# Patient Record
Sex: FEMALE | URBAN - METROPOLITAN AREA
[De-identification: names, ages, dates, MRNs, and addresses within clinical notes are randomized per-mention and may not be internally consistent; named-entity substitution may affect disease eponyms.]

---

## 2018-09-17 ENCOUNTER — APPOINTMENT (RX ONLY)
Dept: URBAN - METROPOLITAN AREA CLINIC 50 | Facility: CLINIC | Age: 68
Setting detail: DERMATOLOGY
End: 2018-09-17

## 2018-09-17 DIAGNOSIS — L81.4 OTHER MELANIN HYPERPIGMENTATION: ICD-10-CM

## 2018-09-17 DIAGNOSIS — D22 MELANOCYTIC NEVI: ICD-10-CM

## 2018-09-17 DIAGNOSIS — L28.0 LICHEN SIMPLEX CHRONICUS: ICD-10-CM

## 2018-09-17 PROBLEM — D22.9 MELANOCYTIC NEVI, UNSPECIFIED: Status: ACTIVE | Noted: 2018-09-17

## 2018-09-17 PROBLEM — D48.5 NEOPLASM OF UNCERTAIN BEHAVIOR OF SKIN: Status: ACTIVE | Noted: 2018-09-17

## 2018-09-17 PROBLEM — L85.3 XEROSIS CUTIS: Status: ACTIVE | Noted: 2018-09-17

## 2018-09-17 PROBLEM — Z85.828 PERSONAL HISTORY OF OTHER MALIGNANT NEOPLASM OF SKIN: Status: ACTIVE | Noted: 2018-09-17

## 2018-09-17 PROBLEM — I10 ESSENTIAL (PRIMARY) HYPERTENSION: Status: ACTIVE | Noted: 2018-09-17

## 2018-09-17 PROCEDURE — 99203 OFFICE O/P NEW LOW 30 MIN: CPT | Mod: 25

## 2018-09-17 PROCEDURE — ? BIOPSY BY SHAVE METHOD

## 2018-09-17 PROCEDURE — ? PRESCRIPTION

## 2018-09-17 PROCEDURE — 11100: CPT

## 2018-09-17 PROCEDURE — ? COUNSELING

## 2018-09-17 RX ORDER — BETAMETHASONE DIPROPIONATE 0.5 MG/G
CREAM TOPICAL BID
Qty: 1 | Refills: 0 | Status: ERX | COMMUNITY
Start: 2018-09-17

## 2018-09-17 RX ADMIN — BETAMETHASONE DIPROPIONATE 1: 0.5 CREAM TOPICAL at 00:00

## 2018-09-17 NOTE — PROCEDURE: BIOPSY BY SHAVE METHOD
Dressing: bandage
Detail Level: Detailed
Body Location Override (Optional - Billing Will Still Be Based On Selected Body Map Location If Applicable): midline upper chest
Depth Of Biopsy: dermis
Lab: [id number]
Biopsy Type: H and E
Was A Bandage Applied: Yes
Bill For Surgical Tray: no
Silver Nitrate Text: The wound bed was treated with silver nitrate after the biopsy was performed.
Curettage Text: The wound bed was treated with curettage after the biopsy was performed.
Anesthesia Type: 1% lidocaine with epinephrine
Post-Care Instructions: I reviewed with the patient in detail post-care instructions. Patient is to keep the biopsy site dry overnight, and then apply bacitracin twice daily until healed. Patient may apply hydrogen peroxide soaks to remove any crusting.
Path Notes (To The Dermatopathologist): Size: 0.5
Biopsy Method: 15 blade
Type Of Destruction Used: Electrodesiccation
Lab Facility: [address] [place] [address]
Size Of Lesion In Cm: 0.5
Hemostasis: Electrocautery
Additional Anesthesia Volume In Cc (Will Not Render If 0): 0
Notification Instructions: Patient will be notified of biopsy results. However, patient instructed to call the office if not contacted within 2 weeks.
Consent: Written consent was obtained and risks were reviewed including but not limited to scarring, infection, bleeding, scabbing, incomplete removal, nerve damage and allergy to anesthesia.
Wound Care: Bacitracin
Cryotherapy Text: The wound bed was treated with cryotherapy after the biopsy was performed.
Billing Type: [employer]
Anesthesia Volume In Cc (Will Not Render If 0): 0.4
Electrodesiccation Text: The wound bed was treated with electrodesiccation after the biopsy was performed.
Electrodesiccation And Curettage Text: The wound bed was treated with electrodesiccation and curettage after the biopsy was performed.

## 2018-09-17 NOTE — PROCEDURE: MIPS QUALITY
Quality 47: Advance Care Plan: Advance Care Planning discussed and documented; advance care plan or surrogate decision maker documented in the medical record.
Quality 131: Pain Assessment And Follow-Up: Pain assessment NOT documented as being performed, documentation the patient is not eligible for a pain assessment using a standardized tool
Detail Level: Zone
Quality 130: Documentation Of Current Medications In The Medical Record: Current Medications Documented
Quality 431: Preventive Care And Screening: Unhealthy Alcohol Use - Screening: Patient screened for unhealthy alcohol use using a single question and scores less than 2 times per year
Quality 226: Preventive Care And Screening: Tobacco Use: Screening And Cessation Intervention: Patient screened for tobacco and never smoked

## 2018-11-19 ENCOUNTER — APPOINTMENT (RX ONLY)
Dept: URBAN - METROPOLITAN AREA CLINIC 50 | Facility: CLINIC | Age: 68
Setting detail: DERMATOLOGY
End: 2018-11-19

## 2018-11-19 DIAGNOSIS — D22 MELANOCYTIC NEVI: ICD-10-CM

## 2018-11-19 PROBLEM — D22.9 MELANOCYTIC NEVI, UNSPECIFIED: Status: ACTIVE | Noted: 2018-11-19

## 2018-11-19 PROCEDURE — 99213 OFFICE O/P EST LOW 20 MIN: CPT

## 2018-11-19 PROCEDURE — ? COUNSELING

## 2018-11-19 NOTE — PROCEDURE: MIPS QUALITY
Detail Level: Zone
Quality 47: Advance Care Plan: Advance Care Planning discussed and documented; advance care plan or surrogate decision maker documented in the medical record.
Quality 431: Preventive Care And Screening: Unhealthy Alcohol Use - Screening: Patient screened for unhealthy alcohol use using a single question and scores less than 2 times per year
Quality 226: Preventive Care And Screening: Tobacco Use: Screening And Cessation Intervention: Patient screened for tobacco and never smoked
Quality 130: Documentation Of Current Medications In The Medical Record: Current Medications Documented
Quality 131: Pain Assessment And Follow-Up: Pain assessment NOT documented as being performed, documentation the patient is not eligible for a pain assessment using a standardized tool

## 2021-09-16 ENCOUNTER — OFFICE (OUTPATIENT)
Dept: URBAN - METROPOLITAN AREA CLINIC 106 | Facility: CLINIC | Age: 71
End: 2021-09-16

## 2021-09-16 VITALS — TEMPERATURE: 97.3 F | WEIGHT: 140 LBS

## 2021-09-16 DIAGNOSIS — K21.9 GASTROESOPHAGEAL REFLUX DISEASE: ICD-10-CM

## 2021-09-16 DIAGNOSIS — R10.31 RLQ PAIN: ICD-10-CM

## 2021-09-16 DIAGNOSIS — Z12.11 SCREENING FOR COLON CANCER: ICD-10-CM

## 2021-09-16 DIAGNOSIS — R10.9 RIGHT FLANK PAIN: ICD-10-CM

## 2021-09-16 DIAGNOSIS — N20.0 HX OF KIDNEY STONE: ICD-10-CM

## 2021-09-16 PROCEDURE — 99205 OFFICE O/P NEW HI 60 MIN: CPT | Performed by: INTERNAL MEDICINE

## 2021-09-16 NOTE — SERVICEHPINOTES
Patient presents for screening colonoscopy due to positive colo guard test.   The patient complains of abdominal pain.    Symptoms began   several  weeks   ago with onset that was    intermittent  .    It is localized as   right flank   pain.    It is described as   moderate   in nature.   Pain quality is described as   crampy  .    It also radiates to the   right flank  .    Discomfort typically lasts    and has a course which is   .   It usually starts   .    Symptom triggers include   .  Alleviating factors include   .    Symptoms have been    since onset.    Alarm features noted:   .   The patient also reports   heartburn  .      Symptoms have caused the patient to    Similar symptoms    occurred in the past, associated with    .   Noteworthy prior abdominal interventions include   .   has been performed previously to evaluate the patient's symptoms.   Remedies tried to date include     with    .    Other pertinent testing to date includes,

## 2021-11-30 ENCOUNTER — OFFICE (OUTPATIENT)
Dept: URBAN - METROPOLITAN AREA CLINIC 106 | Facility: CLINIC | Age: 71
End: 2021-11-30

## 2021-11-30 DIAGNOSIS — K59.04 CHRONIC IDIOPATHIC CONSTIPATION: ICD-10-CM

## 2021-11-30 DIAGNOSIS — K29.70 GASTRITIS: ICD-10-CM

## 2021-11-30 DIAGNOSIS — Z12.11 SCREENING FOR COLON CANCER: ICD-10-CM

## 2021-11-30 DIAGNOSIS — R10.9 RIGHT FLANK PAIN: ICD-10-CM

## 2021-11-30 DIAGNOSIS — K21.9 GASTROESOPHAGEAL REFLUX DISEASE: ICD-10-CM

## 2021-11-30 DIAGNOSIS — N20.0 HX OF KIDNEY STONE: ICD-10-CM

## 2021-11-30 DIAGNOSIS — K64.8 HEMORRHOIDS, INTERNAL: ICD-10-CM

## 2021-11-30 DIAGNOSIS — R10.31 RLQ PAIN: ICD-10-CM

## 2021-11-30 PROCEDURE — 99214 OFFICE O/P EST MOD 30 MIN: CPT

## 2021-11-30 PROCEDURE — G0406 INPT/TELE FOLLOW UP 15: HCPCS

## 2021-11-30 RX ORDER — LINACLOTIDE 145 UG/1
145 CAPSULE, GELATIN COATED ORAL
Qty: 90 | Status: COMPLETED
Start: 2021-11-30 | End: 2024-10-15

## 2021-11-30 RX ORDER — SUCRALFATE 1 G/10ML
SUSPENSION ORAL
Qty: 800 | Status: COMPLETED
Start: 2021-11-30 | End: 2024-10-15

## 2021-11-30 NOTE — SERVICEHPINOTES
Today the patient is following up after EGD/colonoscopy via Telehealth due to COVID-19 crisis. Appointment was approximately 15 minutes long. Patient had some gas and bloating after the procedure but is doing well at this time

## 2022-01-12 ENCOUNTER — OFFICE (OUTPATIENT)
Dept: URBAN - METROPOLITAN AREA CLINIC 106 | Facility: CLINIC | Age: 72
End: 2022-01-12

## 2022-01-12 DIAGNOSIS — R10.31 RLQ PAIN: ICD-10-CM

## 2022-01-12 DIAGNOSIS — K29.70 GASTRITIS: ICD-10-CM

## 2022-01-12 DIAGNOSIS — N20.0 HX OF KIDNEY STONE: ICD-10-CM

## 2022-01-12 DIAGNOSIS — R10.9 RIGHT FLANK PAIN: ICD-10-CM

## 2022-01-12 DIAGNOSIS — K59.04 CHRONIC IDIOPATHIC CONSTIPATION: ICD-10-CM

## 2022-01-12 DIAGNOSIS — K64.8 HEMORRHOIDS, INTERNAL: ICD-10-CM

## 2022-01-12 DIAGNOSIS — K21.9 GASTROESOPHAGEAL REFLUX DISEASE: ICD-10-CM

## 2022-01-12 PROCEDURE — G0406 INPT/TELE FOLLOW UP 15: HCPCS | Performed by: INTERNAL MEDICINE

## 2022-01-12 PROCEDURE — 99214 OFFICE O/P EST MOD 30 MIN: CPT | Performed by: INTERNAL MEDICINE

## 2022-01-12 RX ORDER — PLECANATIDE 3 MG/1
3 TABLET ORAL
Qty: 90 | Status: COMPLETED
Start: 2022-01-12 | End: 2024-10-15

## 2022-01-12 NOTE — SERVICEHPINOTES
cWe have followed the patient for evaluation and treatment of chronic constipation.    Since last visit, the patient has tried   Linzess  .    Symptoms have   remained the same  .    Currently the patient evacuates on average once every   .    Stools are   hard and lumpy   in consistency.      Associated symptoms include   .   Symptoms have been severe enough to cause the patient to   .      Evaluation thus far has included   colonoscopy  .

## 2024-10-15 ENCOUNTER — OFFICE (OUTPATIENT)
Dept: URBAN - METROPOLITAN AREA CLINIC 106 | Facility: CLINIC | Age: 74
End: 2024-10-15

## 2024-10-15 VITALS — WEIGHT: 131 LBS

## 2024-10-15 DIAGNOSIS — R10.31 RLQ PAIN: ICD-10-CM

## 2024-10-15 DIAGNOSIS — K21.9 GASTROESOPHAGEAL REFLUX DISEASE: ICD-10-CM

## 2024-10-15 DIAGNOSIS — K64.8 HEMORRHOIDS, INTERNAL: ICD-10-CM

## 2024-10-15 DIAGNOSIS — K29.70 GASTRITIS: ICD-10-CM

## 2024-10-15 DIAGNOSIS — K59.04 CHRONIC IDIOPATHIC CONSTIPATION: ICD-10-CM

## 2024-10-15 DIAGNOSIS — N20.0 HX OF KIDNEY STONE: ICD-10-CM

## 2024-10-15 DIAGNOSIS — R10.9 RIGHT FLANK PAIN: ICD-10-CM

## 2024-10-15 DIAGNOSIS — Z86.16 PERSONAL HISTORY OF COVID-19: ICD-10-CM

## 2024-10-15 PROCEDURE — 99214 OFFICE O/P EST MOD 30 MIN: CPT

## 2024-10-15 RX ORDER — DICYCLOMINE HYDROCHLORIDE 20 MG/1
60 TABLET ORAL
Qty: 20 | Status: ACTIVE
Start: 2024-10-15

## 2024-10-15 NOTE — SERVICEHPINOTES
LISA SAUCEDO   returns for follow-up today. We have followed the patient for evaluation and treatment of chronic constipation.    Since last visit, the patient has tried   dietary fiber  (pistachios).  Symptoms have   worsened  .    Currently the patient evacuates on average once every   5  days  .    Stools are   sausage shaped, but lumpy (Rock Stool Type 2)   in consistency.      Associated symptoms include intermittent mild   RLQ abdominal pain, a sense of incomplete evacuation and abdominal bloating/distension  .   Symptoms have been severe enough to cause the patient to   avoid leaving home/going out  .       returns today for follow-up for chronic constipation. She reports that over the last six months, her constipation has worsened. She reports frequently going up to one week without a bowel movement. She reports a sensation of incomplete evacuation. She also endorses RLQ pain, gas and bloating which are new for her and typical occur after eating pistachios. She denies any nausea, vomiting, epigastric pain, melena, blood in the stool. br

## 2025-03-18 ENCOUNTER — AMBULATORY SURGICAL CENTER (OUTPATIENT)
Dept: URBAN - METROPOLITAN AREA SURGERY 68 | Facility: SURGERY | Age: 75
End: 2025-03-18

## 2025-03-18 DIAGNOSIS — K29.70 GASTRITIS, UNSPECIFIED, WITHOUT BLEEDING: ICD-10-CM

## 2025-03-18 DIAGNOSIS — K57.30 DIVERTICULOSIS OF LARGE INTESTINE WITHOUT PERFORATION OR ABS: ICD-10-CM

## 2025-03-18 DIAGNOSIS — K21.9 GASTRO-ESOPHAGEAL REFLUX DISEASE WITHOUT ESOPHAGITIS: ICD-10-CM

## 2025-03-18 DIAGNOSIS — K25.4 CHRONIC OR UNSPECIFIED GASTRIC ULCER WITH HEMORRHAGE: ICD-10-CM

## 2025-03-18 PROCEDURE — 43239 EGD BIOPSY SINGLE/MULTIPLE: CPT | Performed by: INTERNAL MEDICINE

## 2025-03-18 PROCEDURE — 43255 EGD CONTROL BLEEDING ANY: CPT | Mod: 59 | Performed by: INTERNAL MEDICINE

## 2025-03-18 PROCEDURE — 45378 DIAGNOSTIC COLONOSCOPY: CPT | Performed by: INTERNAL MEDICINE

## 2025-03-18 NOTE — SERVICEHPINOTES
LISA SAUCEDO returns for follow-up today. We have followed the patient for evaluation and treatment of chronic constipation.  Since last visit, the patient has tried dietary fiber(pistachios). Symptoms have worsened. Currently the patient evacuates on average once every 5 days.  Stools are sausage shaped, but lumpy (Baskin Stool Type 2) in consistency. Associated symptoms include intermittent mild RLQ abdominal pain, a sense of incomplete evacuation and abdominal bloating/distension. Symptoms have been severe enough to cause the patient to avoid leaving home/going out.  returns today for follow-up for chronic constipation. She reports that over the last six months, her constipation has worsened. She reports frequently going up to one week without a bowel movement. She reports a sensation of incomplete evacuation. She also endorses RLQ pain, gas and bloating which are new for her and typical occur after eating pistachios. She denies any nausea, vomiting, epigastric pain, melena, blood in the stool.

## 2025-05-08 ENCOUNTER — OFFICE (OUTPATIENT)
Dept: URBAN - METROPOLITAN AREA CLINIC 106 | Facility: CLINIC | Age: 75
End: 2025-05-08

## 2025-05-08 VITALS — WEIGHT: 127.6 LBS

## 2025-05-08 DIAGNOSIS — K25.4 CHRONIC OR UNSPECIFIED GASTRIC ULCER WITH HEMORRHAGE: ICD-10-CM

## 2025-05-08 DIAGNOSIS — K29.70 GASTRITIS: ICD-10-CM

## 2025-05-08 DIAGNOSIS — K59.04 CHRONIC IDIOPATHIC CONSTIPATION: ICD-10-CM

## 2025-05-08 DIAGNOSIS — K64.8 HEMORRHOIDS, INTERNAL: ICD-10-CM

## 2025-05-08 DIAGNOSIS — Z86.16 PERSONAL HISTORY OF COVID-19: ICD-10-CM

## 2025-05-08 DIAGNOSIS — K22.10 ULCER OF ESOPHAGUS WITHOUT BLEEDING: ICD-10-CM

## 2025-05-08 DIAGNOSIS — K21.9 GASTROESOPHAGEAL REFLUX DISEASE: ICD-10-CM

## 2025-05-08 DIAGNOSIS — R10.9 RIGHT FLANK PAIN: ICD-10-CM

## 2025-05-08 DIAGNOSIS — N20.0 HX OF KIDNEY STONE: ICD-10-CM

## 2025-05-08 DIAGNOSIS — K57.30 DIVERTICULOSIS OF LARGE INTESTINE WITHOUT PERFORATION OR ABS: ICD-10-CM

## 2025-05-08 DIAGNOSIS — R10.31 RLQ PAIN: ICD-10-CM

## 2025-05-08 PROCEDURE — 99214 OFFICE O/P EST MOD 30 MIN: CPT

## 2025-05-08 RX ORDER — OMEPRAZOLE 40 MG/1
CAPSULE, DELAYED RELEASE ORAL
Qty: 90 | Status: ACTIVE
Start: 2025-05-08

## 2025-05-08 RX ORDER — SUCRALFATE 1 G/1
4 TABLET ORAL
Qty: 120 | Status: ACTIVE
Start: 2025-05-08

## 2025-05-08 NOTE — SERVICEHPINOTES
LISA SAUCEDO   returns today for follow-up from EGD and Colonoscopy on   3/18/2025  .    She had some gas and bloating after the procedures that resolved the following day. She denies any new persistent abdominal pain, change in bowel movements, or rectal bleeding.

## 2025-06-07 ENCOUNTER — OFFICE (OUTPATIENT)
Dept: URBAN - METROPOLITAN AREA CLINIC 106 | Facility: CLINIC | Age: 75
End: 2025-06-07

## 2025-06-07 VITALS — WEIGHT: 127.4 LBS

## 2025-06-07 DIAGNOSIS — K64.8 OTHER HEMORRHOIDS: ICD-10-CM

## 2025-06-07 PROBLEM — K57.30 DIVERTICULOSIS OF LARGE INTESTINE WITHOUT PERFORATION OR ABS: Status: ACTIVE | Noted: 2025-03-18

## 2025-06-07 PROBLEM — K25.4 CHRONIC OR UNSPECIFIED GASTRIC ULCER WITH HEMORRHAGE: Status: ACTIVE | Noted: 2025-03-18

## 2025-06-07 PROBLEM — K29.70 GASTRITIS, UNSPECIFIED, WITHOUT BLEEDING: Status: ACTIVE | Noted: 2025-03-18

## 2025-06-07 PROBLEM — K22.10 ULCER OF ESOPHAGUS WITHOUT BLEEDING: Status: ACTIVE | Noted: 2025-03-18

## 2025-06-07 PROCEDURE — 46221 LIGATION OF HEMORRHOID(S): CPT | Performed by: INTERNAL MEDICINE

## 2025-07-12 ENCOUNTER — OFFICE (OUTPATIENT)
Dept: URBAN - METROPOLITAN AREA CLINIC 106 | Facility: CLINIC | Age: 75
End: 2025-07-12

## 2025-07-12 DIAGNOSIS — K64.8 OTHER HEMORRHOIDS: ICD-10-CM

## 2025-07-12 PROBLEM — K25.4 CHRONIC OR UNSPECIFIED GASTRIC ULCER WITH HEMORRHAGE: Status: ACTIVE | Noted: 2025-03-18

## 2025-07-12 PROBLEM — K57.30 DIVERTICULOSIS OF LARGE INTESTINE WITHOUT PERFORATION OR ABS: Status: ACTIVE | Noted: 2025-03-18

## 2025-07-12 PROBLEM — K22.10 ULCER OF ESOPHAGUS WITHOUT BLEEDING: Status: ACTIVE | Noted: 2025-03-18

## 2025-07-12 PROBLEM — K29.70 GASTRITIS, UNSPECIFIED, WITHOUT BLEEDING: Status: ACTIVE | Noted: 2025-03-18

## 2025-07-12 PROCEDURE — 46221 LIGATION OF HEMORRHOID(S): CPT | Performed by: INTERNAL MEDICINE
